# Patient Record
Sex: MALE | Race: WHITE
[De-identification: names, ages, dates, MRNs, and addresses within clinical notes are randomized per-mention and may not be internally consistent; named-entity substitution may affect disease eponyms.]

---

## 2021-05-20 ENCOUNTER — HOSPITAL ENCOUNTER (EMERGENCY)
Dept: HOSPITAL 60 - LB.ED | Age: 26
Discharge: PSYCHIATRIC OR SUBSTANCE USE FACILITY | End: 2021-05-20
Payer: COMMERCIAL

## 2021-05-20 DIAGNOSIS — F32.9: Primary | ICD-10-CM

## 2021-05-20 DIAGNOSIS — Z20.822: ICD-10-CM

## 2021-05-20 PROCEDURE — U0002 COVID-19 LAB TEST NON-CDC: HCPCS

## 2021-05-20 NOTE — EDM.PDOCBH
ED HPI GENERAL MEDICAL PROBLEM





- General


Chief Complaint: Behavioral/Psych


Stated Complaint: MENTAL


Time Seen by Provider: 05/20/21 15:45


Source of Information: Reports: Patient


History Limitations: Reports: No Limitations





- History of Present Illness


INITIAL COMMENTS - FREE TEXT/NARRATIVE: 


patient with a PNHx of depression and ADHD who presented to the ER after a tele-

health visit with a psychiatrist who referred him to be evaluated.





Per patient and psychiatrist's report - patient ran out of his medications 3 

months ago. Reports daily thoughts of '' not wanting be around '' but denies any

suicidal plans or actual thoughts.





Denies illicit drugs abuse.





Reports that he wants to be treated and stabilized because he hasn't been 

productive lately. 





His psychiatrist placed him on a 72 hrs hold and directed him to the ER. 





Duration: Chronic





Past Medical History





- Past Health History


Medical/Surgical History: Denies Medical/Surgical History


Psychiatric History: Reports: ADHD, Other (See Below) (depression)





ED ROS GENERAL





- Review of Systems


Review Of Systems: See Below


Constitutional: Reports: No Symptoms


HEENT: Reports: No Symptoms


Respiratory: Reports: No Symptoms


Cardiovascular: Reports: No Symptoms


GI/Abdominal: Reports: No Symptoms


Musculoskeletal: Reports: No Symptoms


Neurological: Reports: No Symptoms


Psychiatric: Reports: Depression





ED EXAM, BEHAVIORAL HEALTH





- Physical Exam


Exam: See Below


Exam Limited By: No Limitations


General Appearance: Alert, WD/WN, No Apparent Distress


Eye Exam: Bilateral Eye: EOMI


Respiratory/Chest: No Respiratory Distress, Lungs Clear


Cardiovascular: Normal Peripheral Pulses


GI/Abdominal: Normal Bowel Sounds


Extremities: Normal Inspection


Neurological: Alert, Normal Mood/Affect


Psychiatric: Alert, Normal Affect, Normal Cognition, Normal Mood, Oriented





COURSE, BEHAVIORAL HEALTH COMP





- Course


Vital Signs: 





                                Last Vital Signs











Temp  36.5 C   05/20/21 15:30


 


Pulse  61   05/20/21 15:30


 


Resp  18   05/20/21 15:30


 


BP  125/82   05/20/21 15:30


 


Pulse Ox  100   05/20/21 15:30











Orders, Labs, Meds: 





                               Active Orders 24 hr











 Category Date Time Status


 


 BASIC METABOLIC PANEL,BMP [CHEM] Stat Lab  05/20/21 15:30 Ordered


 


 ETHANOL BLOOD MEDICAL [CHEM] Stat Lab  05/20/21 15:30 Ordered


 


 TSH ULTRASENSITIVE [CHEM] Stat Lab  05/20/21 15:30 Ordered








                                Laboratory Tests











  05/20/21 05/20/21 Range/Units





  15:41 15:41 


 


WBC   7.8  (4.0-11.0)  K/uL


 


RBC   5.40  (4.50-6.50)  M/uL


 


Hgb   16.5  (13.0-18.0)  g/dL


 


Hct   47.7  (40.0-54.0)  %


 


MCV   88  (76-96)  fL


 


MCH   30.6  (27.0-32.0)  pg


 


MCHC   34.6  (31.0-35.0)  g/dL


 


RDW   14.2  (11.0-16.0)  %


 


Plt Count   224  (150-400)  K/uL


 


MPV   9.0  (6.0-10.0)  fL


 


Urine Opiates Screen  Negative   (NEGATIVE)  


 


Ur Oxycodone Screen  Negative   (NEGATIVE)  


 


Urine Methadone Screen  Negative   (NEGATIVE)  


 


Ur Barbiturates Screen  Negative   (NEGATIVE)  


 


Ur Tricyclics Screen  Negative   (NEGATIVE)  


 


Ur Phencyclidine Scrn  Negative   (NEGATIVE)  


 


Ur Amphetamine Screen  Negative   (NEGATIVE)  


 


U Methamphetamines Scrn  Negative   (NEGATIVE)  


 


Urine MDMA Screen  Negative   (NEGATIVE)  


 


U Benzodiazepines Scrn  Negative   (NEGATIVE)  


 


U Cocaine Metab Screen  Negative   (NEGATIVE)  


 


U Marijuana (THC) Screen  Positive H   (NEGATIVE)  








Medications














Discontinued Medications














Generic Name Dose Route Start Last Admin





  Trade Name Freq  PRN Reason Stop Dose Admin


 


Lorazepam  1 mg  05/20/21 15:30 





  Lorazepam 1 Mg Tab  PO  05/20/21 15:31 





  ONETIME ONE  











Medical Clearance: 





05/20/21 16:09


laboratory tests were ordered





vitals were obtained





patient is medically stable and has been very cooperative  


Discharge vs Psych Eval/Treatment:: 





05/20/21 16:10








discussed the case with the patient and his mom - they all agree with the plan 

for admission to [facility] health facility for stabilization 





Departure





- Departure


Time of Disposition: 16:10


Disposition: DC/Tfer to [facility] Hosp[facility]


Condition: Good


Clinical Impression: 


 Depressive disorder








- Discharge Information


*PRESCRIPTION DRUG MONITORING PROGRAM REVIEWED*: Not Applicable


*COPY OF PRESCRIPTION DRUG MONITORING REPORT IN PATIENT ERNESTO: Not Applicable


Referrals: 


PCP,None [Primary Care Provider] - 


Forms:  ED Department Discharge





Sepsis Event Note (ED)





- Evaluation


Sepsis Screening Result: No Definite Risk





- Focused Exam


Vital Signs: 





                                   Vital Signs











  Temp Pulse Resp BP Pulse Ox


 


 05/20/21 15:30  36.5 C  61  18  125/82  100














- Problem List & Annotations


(1) Depressive disorder


SNOMED Code(s): 80319292


   Code(s): F32.9 - MAJOR DEPRESSIVE DISORDER, SINGLE EPISODE, UNSPECIFIED   

Status: Acute   Priority: Low   Current Visit: Yes   





- Problem List Review


Problem List Initiated/Reviewed/Updated: Yes





- My Orders


Last 24 Hours: 





My Active Orders





05/20/21 15:30


BASIC METABOLIC PANEL,BMP [CHEM] Stat 


ETHANOL BLOOD MEDICAL [CHEM] Stat 


TSH ULTRASENSITIVE [CHEM] Stat 














- Assessment/Plan


Last 24 Hours: 





My Active Orders





05/20/21 15:30


BASIC METABOLIC PANEL,BMP [CHEM] Stat 


ETHANOL BLOOD MEDICAL [CHEM] Stat 


TSH ULTRASENSITIVE [CHEM] Stat 











Plan: 





[place]

## 2021-06-01 ENCOUNTER — HOSPITAL ENCOUNTER (EMERGENCY)
Dept: HOSPITAL 60 - LB.ED | Age: 26
LOS: 1 days | Discharge: HOME | End: 2021-06-02
Payer: COMMERCIAL

## 2021-06-01 DIAGNOSIS — T39.1X1A: Primary | ICD-10-CM

## 2021-06-02 NOTE — ER
HISTORY OF PRESENT ILLNESS:  A 26-year-old male who comes in telling us that he took a

mouthful of Tylenol about an hour and a half ago, which would be about 9:30.  He stated they

were 500 mg tablets, he is not sure.  He thinks it was around 10 of the tablets.  The

patient, when asked if he was trying to commit suicide, hesitated with his answer and then

said maybe.  He came in asking for the antidote to correct the Tylenol dosage.  The patient

states he feels fine, just a little weird.  I asked him why he did this and he tells me that

he broke up with his girlfriend today which he had been with for 2 years.  The patient moved

to this area to be with her and now he wants to leave this area as soon as possible.  The

patient had been seen previously here on May 20th where he had a telemedicine consult with

Dr. Andino who at that time thought the patient could be dangerous to himself.  The

patient tells me he tried to do something like this once before as well.  He denies drinking

any alcohol tonight or using any other drugs.  He takes Abilify and states he did take his

dose today.

 

OBJECTIVE:  GENERAL APPEARANCE:  The patient is awake and alert.  No obvious distress.

VITAL SIGNS: The patient is afebrile.  Blood pressure 124/83, pulse 67, respirations 16, and

O2 sats 100%. LUNGS:  Clear.  CARDIAC:  Heart sounds distinct without murmurs.  SKIN:  Warm

and dry.

 

INITIAL TREATMENT:  Poison Control was consulted.  They suggested a Tylenol level which we

did.  The Tylenol level was 94.8.  We then repeated it after 4 hours with a reading of 112,

which is not in the toxic level.  Comprehensive metabolic panel was obtained.  Liver enzymes

are normal.  CBC is normal.  We also had Telemedicine do a mental health consult on the

patient.  I spoke with the person after the consult.  She feels that the patient is safe to

go home.  He has had a bad day and made a bad decision, but he came in on his own will to

correct it and she does not feel that he is in any danger of further harm to himself or

others at this time.  The patient is on Abilify.  He has a good family support system, she

reports and she is confident that he is stable enough to leave our facility.  We had the

patient stay here until we were able to get his second Tylenol level and at that point he

was discharged.  He is to stay on his Abilify.  The patient really does need to establish

care with a primary care provider quickly either in this area; he should recheck within a

couple of days in the clinic or if he does leave this area and he does go back to

[place], he is to establish care as soon as he can there.  The patient has no further

questions.

 

DIAGNOSIS:  Medication overdose attempt.

 

 

CRS/MODL

DD:  06/02/2021 07:32:09

DT:  06/02/2021 13:33:35

Job #:  [id number]